# Patient Record
Sex: FEMALE
[De-identification: names, ages, dates, MRNs, and addresses within clinical notes are randomized per-mention and may not be internally consistent; named-entity substitution may affect disease eponyms.]

---

## 2022-07-06 PROBLEM — Z00.00 ENCOUNTER FOR PREVENTIVE HEALTH EXAMINATION: Status: ACTIVE | Noted: 2022-07-06

## 2022-07-08 ENCOUNTER — APPOINTMENT (OUTPATIENT)
Dept: OTOLARYNGOLOGY | Facility: CLINIC | Age: 62
End: 2022-07-08

## 2022-07-08 VITALS
HEIGHT: 58 IN | WEIGHT: 100 LBS | DIASTOLIC BLOOD PRESSURE: 78 MMHG | HEART RATE: 74 BPM | BODY MASS INDEX: 20.99 KG/M2 | RESPIRATION RATE: 18 BRPM | OXYGEN SATURATION: 100 % | TEMPERATURE: 98.4 F | SYSTOLIC BLOOD PRESSURE: 119 MMHG

## 2022-07-08 DIAGNOSIS — Z78.9 OTHER SPECIFIED HEALTH STATUS: ICD-10-CM

## 2022-07-08 DIAGNOSIS — Z80.9 FAMILY HISTORY OF MALIGNANT NEOPLASM, UNSPECIFIED: ICD-10-CM

## 2022-07-08 DIAGNOSIS — Z82.49 FAMILY HISTORY OF ISCHEMIC HEART DISEASE AND OTHER DISEASES OF THE CIRCULATORY SYSTEM: ICD-10-CM

## 2022-07-08 DIAGNOSIS — Z86.39 PERSONAL HISTORY OF OTHER ENDOCRINE, NUTRITIONAL AND METABOLIC DISEASE: ICD-10-CM

## 2022-07-08 PROCEDURE — 69210 REMOVE IMPACTED EAR WAX UNI: CPT

## 2022-07-08 PROCEDURE — 31575 DIAGNOSTIC LARYNGOSCOPY: CPT

## 2022-07-08 PROCEDURE — 99204 OFFICE O/P NEW MOD 45 MIN: CPT | Mod: 25

## 2022-07-08 RX ORDER — OMEPRAZOLE 40 MG/1
40 CAPSULE, DELAYED RELEASE ORAL
Qty: 90 | Refills: 1 | Status: ACTIVE | COMMUNITY
Start: 2022-07-08 | End: 1900-01-01

## 2022-07-08 RX ORDER — IPRATROPIUM BROMIDE 21 UG/1
0.03 SPRAY NASAL
Qty: 3 | Refills: 1 | Status: ACTIVE | COMMUNITY
Start: 2022-07-08 | End: 1900-01-01

## 2022-07-08 NOTE — HISTORY OF PRESENT ILLNESS
[de-identified] : 63 yo F presenting with a cough since March. At the time she went to urgent care and saw her pmd and had cxr and told clear. She saw him again and was told she has postnasal mucus and allergies and was started on allegra and flonase. She saw an ent and was told she had postnasal drip and was referred to an allergist >> She was then told she is allergic to some grasses and dust. She was prescribed Flonase and Allegra till the end of the summer and notices minimal improvement in allergies. She is also using cumin oil for allergies. She was told to see pulm but she has no appt yet. Pt denies heartburn sx. She was referred by sister in law. She uses Qtips. Nonsmoker. No FH or SH pertinent to cc. Denies fevers. chills, sweats.

## 2022-07-08 NOTE — PHYSICAL EXAM
[Midline] : trachea located in midline position [Normal] : no rashes [Laryngoscopy Performed] : laryngoscopy was performed, see procedure section for findings [de-identified] : r copious cerumen removed atraumatically with suction, l nl, b TMs intact [de-identified] : atrophic rhinitis [de-identified] : atrophic rhinitis  [de-identified] : gait steady

## 2022-08-04 ENCOUNTER — NON-APPOINTMENT (OUTPATIENT)
Age: 62
End: 2022-08-04

## 2022-08-12 ENCOUNTER — APPOINTMENT (OUTPATIENT)
Dept: OTOLARYNGOLOGY | Facility: CLINIC | Age: 62
End: 2022-08-12

## 2022-08-12 VITALS
OXYGEN SATURATION: 99 % | BODY MASS INDEX: 20.15 KG/M2 | WEIGHT: 96 LBS | SYSTOLIC BLOOD PRESSURE: 118 MMHG | HEART RATE: 79 BPM | RESPIRATION RATE: 18 BRPM | DIASTOLIC BLOOD PRESSURE: 73 MMHG | TEMPERATURE: 98.2 F | HEIGHT: 58 IN

## 2022-08-12 DIAGNOSIS — J31.0 CHRONIC RHINITIS: ICD-10-CM

## 2022-08-12 PROCEDURE — 99214 OFFICE O/P EST MOD 30 MIN: CPT | Mod: 25

## 2022-08-12 PROCEDURE — 31231 NASAL ENDOSCOPY DX: CPT

## 2022-08-13 PROBLEM — J31.0 CHRONIC RHINITIS: Status: ACTIVE | Noted: 2022-08-13

## 2022-08-13 NOTE — PROCEDURE
[Serial Number: ___] : Serial Number: [unfilled] [Posterior Lesion] : posterior lesion [Anterior rhinoscopy insufficient to account for symptoms] : anterior rhinoscopy insufficient to account for symptoms [Flexible Endoscope] : examined with the flexible endoscope [Pale] : pale [Normal] : the paranasal sinuses had no abnormalities [FreeTextEntry6] : done to followup on atrophic rhinitis and reflux laryngitis as she is still coughing.\par postnasal drip is much better\par laryngeal appearance improved. (scope advanced to see larynx also)

## 2022-08-13 NOTE — HISTORY OF PRESENT ILLNESS
[de-identified] : 1 mo followup exam for this 63 yo F with cough, reflux laryngitis and atrophic rhinitis. She said she is still coughing and that she saw pulmonologist who prescribed benzonatate and has scheduled her for pft's next week. She said on diet, mech and omep for reflux in addition to atrovent the cough is improved, but has not resolved. -f/s/c

## 2022-08-13 NOTE — PHYSICAL EXAM
[Midline] : trachea located in midline position [Normal] : no rashes [de-identified] : thin [de-identified] : thin

## 2022-08-13 NOTE — REASON FOR VISIT
[Subsequent Evaluation] : a subsequent evaluation for [FreeTextEntry2] : cough, reflux laryngitis, atrophic rhinitis

## 2022-09-06 ENCOUNTER — APPOINTMENT (OUTPATIENT)
Dept: OTOLARYNGOLOGY | Facility: CLINIC | Age: 62
End: 2022-09-06

## 2022-09-06 VITALS
BODY MASS INDEX: 20.49 KG/M2 | OXYGEN SATURATION: 99 % | TEMPERATURE: 98.2 F | WEIGHT: 97.6 LBS | HEART RATE: 72 BPM | HEIGHT: 58 IN | RESPIRATION RATE: 15 BRPM | DIASTOLIC BLOOD PRESSURE: 68 MMHG | SYSTOLIC BLOOD PRESSURE: 110 MMHG

## 2022-09-06 DIAGNOSIS — R05.9 COUGH, UNSPECIFIED: ICD-10-CM

## 2022-09-06 PROCEDURE — 99214 OFFICE O/P EST MOD 30 MIN: CPT

## 2022-09-06 NOTE — PROCEDURE
[None] : none [Flexible Endoscope] : examined with the flexible endoscope [Normal] : the false vocal folds were pink and regular, the ventricular sulcus was open, the true vocal folds were glistening white, tense and of equal length, mobility, and height [Hoarseness] : hoarseness not clearly evaluated by indirect laryngoscopy [Dysphagia] : dysphagia not clearly evaluated by indirect laryngoscopy [Serial Number: ___] : Serial Number: [unfilled] [Interarytenoid Edema] : interarytenoid area edematous [de-identified] : done at pt's request to re-evaluate reflux laryngitis, found to have improved iah

## 2022-09-06 NOTE — HISTORY OF PRESENT ILLNESS
[de-identified] : 3.5 week followup for this 61 y/o F with cough, reflux laryngitis, and atrophic rhinitis. She saw pulmonologist for cough and had pulmonary tests and was told they were nl. She feels cough has improved since last visit. For reflux laryngitis she is following diet/ mechanical precautions and she is on omeprazole. She feels it has improved. She also saw GI who started famotidine 20 mg in the evenings and she has upcoming endoscopy in October. She uses atrovent spray for atrophic rhinitis and feels this has improved.

## 2022-09-06 NOTE — ASSESSMENT
[FreeTextEntry1] : 1. Reflux Laryngitis - improving\par -continue diet/ mechanical precautions\par -continue pepcid and prilosec for now\par -pt has upcoming endoscopy in October\par 2. Atrophic Rhinitis\par -continue atrovent\par 3. cough\par -improved\par -continue followup with pulmonary\par RTC in 2 months or sooner as needed\par \par MARIO Hdz was scribe for this note; it was reviewed and modified as necessary by Jonathan Almonte MD\par

## 2022-09-06 NOTE — PHYSICAL EXAM
[Midline] : trachea located in midline position [Laryngoscopy Performed] : laryngoscopy was performed, see procedure section for findings [Normal] : no nystagmus [de-identified] : thin [de-identified] : thin [de-identified] : gait steady

## 2022-10-04 ENCOUNTER — NON-APPOINTMENT (OUTPATIENT)
Age: 62
End: 2022-10-04

## 2022-11-07 ENCOUNTER — APPOINTMENT (OUTPATIENT)
Dept: OTOLARYNGOLOGY | Facility: CLINIC | Age: 62
End: 2022-11-07

## 2022-11-07 VITALS
BODY MASS INDEX: 19.94 KG/M2 | SYSTOLIC BLOOD PRESSURE: 103 MMHG | OXYGEN SATURATION: 98 % | TEMPERATURE: 97.5 F | RESPIRATION RATE: 16 BRPM | HEIGHT: 58 IN | WEIGHT: 95 LBS | DIASTOLIC BLOOD PRESSURE: 65 MMHG | HEART RATE: 68 BPM

## 2022-11-07 DIAGNOSIS — R13.10 DYSPHAGIA, UNSPECIFIED: ICD-10-CM

## 2022-11-07 PROCEDURE — 31575 DIAGNOSTIC LARYNGOSCOPY: CPT

## 2022-11-07 PROCEDURE — 99213 OFFICE O/P EST LOW 20 MIN: CPT | Mod: 25

## 2022-11-07 NOTE — REASON FOR VISIT
[Subsequent Evaluation] : a subsequent evaluation for [FreeTextEntry2] : reflux laryngitis, atrophic rhinitis

## 2022-11-07 NOTE — ASSESSMENT
[FreeTextEntry1] : improved reflux laryngitis but not resolved\par continue diet mech precautions and omeprazole qam famotidine q pm\par rtc 2 mo

## 2022-11-07 NOTE — HISTORY OF PRESENT ILLNESS
[de-identified] : 2 month followup visit for this 61 y/o F with reflux laryngitis. She is following dietary/ mechanical precautions and is on omeprazole and famotidine. She has atrophic rhinitis and is using atrovent nasal spray. She reports significant improvement but not resolution; still has mild dysphagia and odynophagia. She requests to have her larynx scoped again.

## 2022-11-07 NOTE — PROCEDURE
[Complicated Symptoms] : complicated symptoms requiring more thorough examination than provided by mirror [Flexible Endoscope] : examined with the flexible endoscope [Serial Number: ___] : Serial Number: [unfilled] [Normal] : posterior cricoid area had healthy pink mucosa in the interarytenoid area and the esophageal inlet [Interarytenoid Edema] : interarytenoid area edematous [de-identified] : mild iah cw rl, improved

## 2022-11-07 NOTE — PHYSICAL EXAM
[Laryngoscopy Performed] : laryngoscopy was performed, see procedure section for findings [Normal] : no nystagmus [de-identified] : gait steady

## 2023-05-08 ENCOUNTER — APPOINTMENT (OUTPATIENT)
Dept: OTOLARYNGOLOGY | Facility: CLINIC | Age: 63
End: 2023-05-08
Payer: COMMERCIAL

## 2023-05-08 VITALS
WEIGHT: 95 LBS | DIASTOLIC BLOOD PRESSURE: 64 MMHG | HEIGHT: 58 IN | TEMPERATURE: 97.5 F | OXYGEN SATURATION: 98 % | SYSTOLIC BLOOD PRESSURE: 99 MMHG | RESPIRATION RATE: 16 BRPM | HEART RATE: 77 BPM | BODY MASS INDEX: 19.94 KG/M2

## 2023-05-08 DIAGNOSIS — K21.9 ACUTE LARYNGITIS: ICD-10-CM

## 2023-05-08 DIAGNOSIS — J04.0 ACUTE LARYNGITIS: ICD-10-CM

## 2023-05-08 PROCEDURE — 69210 REMOVE IMPACTED EAR WAX UNI: CPT

## 2023-05-08 PROCEDURE — 99213 OFFICE O/P EST LOW 20 MIN: CPT | Mod: 25

## 2023-05-08 PROCEDURE — 31575 DIAGNOSTIC LARYNGOSCOPY: CPT

## 2023-05-08 NOTE — HISTORY OF PRESENT ILLNESS
[de-identified] : 6 month followup visit for this 62 y/o F with h/o reflux laryngitis. She was on famotidine for 3 months and feels reflux is significantly improved. She is following dietary/ mechanical precautions and takes OTC licorice root and feels it helps. SHe wishes to have laryngoscopy to check her vocal cords. Dysphagia and odynophagia are improved.

## 2023-05-08 NOTE — PHYSICAL EXAM
[Midline] : trachea located in midline position [Laryngoscopy Performed] : laryngoscopy was performed, see procedure section for findings [de-identified] : b copious cerumen removed atraumatically with suction l>r, b TMs nl  [Normal] : no nystagmus [de-identified] : gait steady

## 2023-05-08 NOTE — ASSESSMENT
[FreeTextEntry1] : 1. b cerumen impaction \par -cerumen removed\par -ears felt better\par -avoid qtip usage\par 2. reflux laryngitis improved but still has some dysphagia (very mild)\par -continue diet/ mechanical precautions\par RTC in 6 months; asked to call sooner for any issues

## 2023-05-08 NOTE — PROCEDURE
[Cerumen Impaction] : Cerumen Impaction [Same] : same as the Pre Op Dx. [] : Removal of Cerumen [Complicated Symptoms] : complicated symptoms requiring more thorough examination than provided by mirror [None] : none [Flexible Endoscope] : examined with the flexible endoscope [Normal] : the false vocal folds were pink and regular, the ventricular sulcus was open, the true vocal folds were glistening white, tense and of equal length, mobility, and height [Serial Number: ___] : Serial Number: [unfilled] [Interarytenoid Edema] : interarytenoid area edematous [de-identified] : done for h/o reflux laryngitis\par found to have improvement in rl, mild iah  [FreeTextEntry5] : b copious cerumen removed atraumatically with suction l>r, b TMs nl

## 2023-11-08 ENCOUNTER — APPOINTMENT (OUTPATIENT)
Dept: OTOLARYNGOLOGY | Facility: CLINIC | Age: 63
End: 2023-11-08
Payer: MEDICARE

## 2023-11-08 VITALS
DIASTOLIC BLOOD PRESSURE: 76 MMHG | SYSTOLIC BLOOD PRESSURE: 135 MMHG | HEIGHT: 58 IN | WEIGHT: 95 LBS | OXYGEN SATURATION: 88 % | HEART RATE: 90 BPM | BODY MASS INDEX: 19.94 KG/M2

## 2023-11-08 DIAGNOSIS — J04.0 ACUTE LARYNGITIS: ICD-10-CM

## 2023-11-08 DIAGNOSIS — R13.14 DYSPHAGIA, PHARYNGOESOPHAGEAL PHASE: ICD-10-CM

## 2023-11-08 DIAGNOSIS — K21.9 ACUTE LARYNGITIS: ICD-10-CM

## 2023-11-08 PROCEDURE — 31575 DIAGNOSTIC LARYNGOSCOPY: CPT

## 2023-11-08 PROCEDURE — 99213 OFFICE O/P EST LOW 20 MIN: CPT | Mod: 25

## 2023-11-08 PROCEDURE — 69210 REMOVE IMPACTED EAR WAX UNI: CPT

## 2024-05-06 ENCOUNTER — APPOINTMENT (OUTPATIENT)
Dept: OTOLARYNGOLOGY | Facility: CLINIC | Age: 64
End: 2024-05-06
Payer: MEDICARE

## 2024-05-06 VITALS
WEIGHT: 93 LBS | DIASTOLIC BLOOD PRESSURE: 64 MMHG | OXYGEN SATURATION: 98 % | HEART RATE: 83 BPM | HEIGHT: 59 IN | SYSTOLIC BLOOD PRESSURE: 100 MMHG | TEMPERATURE: 98 F | BODY MASS INDEX: 18.75 KG/M2

## 2024-05-06 DIAGNOSIS — J04.0 ACUTE LARYNGITIS: ICD-10-CM

## 2024-05-06 DIAGNOSIS — H61.23 IMPACTED CERUMEN, BILATERAL: ICD-10-CM

## 2024-05-06 DIAGNOSIS — H61.21 IMPACTED CERUMEN, RIGHT EAR: ICD-10-CM

## 2024-05-06 DIAGNOSIS — J31.0 CHRONIC RHINITIS: ICD-10-CM

## 2024-05-06 DIAGNOSIS — K21.9 ACUTE LARYNGITIS: ICD-10-CM

## 2024-05-06 PROCEDURE — 99213 OFFICE O/P EST LOW 20 MIN: CPT | Mod: 25

## 2024-05-06 PROCEDURE — 69210 REMOVE IMPACTED EAR WAX UNI: CPT

## 2024-05-06 PROCEDURE — 31575 DIAGNOSTIC LARYNGOSCOPY: CPT

## 2024-05-06 RX ORDER — IPRATROPIUM BROMIDE 21 UG/1
0.03 SPRAY NASAL
Qty: 3 | Refills: 1 | Status: ACTIVE | COMMUNITY
Start: 2024-05-06 | End: 1900-01-01

## 2024-05-06 NOTE — PROCEDURE
[Cerumen Impaction] : Cerumen Impaction [Same] : same as the Pre Op Dx. [] : Removal of Cerumen [Complicated Symptoms] : complicated symptoms requiring more thorough examination than provided by mirror [Topical Lidocaine] : topical lidocaine [Oxymetazoline HCl] : oxymetazoline HCl [Flexible Endoscope] : examined with the flexible endoscope [Normal] : posterior cricoid area had healthy pink mucosa in the interarytenoid area and the esophageal inlet [Serial Number: ___] : Serial Number: [unfilled] [de-identified] : done to evaluate reflux found to have mild iah c/w rl  [FreeTextEntry5] : l nl, r copious cerumen removed atraumatically with suction, b TMs nl

## 2024-05-06 NOTE — HISTORY OF PRESENT ILLNESS
[de-identified] : 6 month follow up visit for this 63 y/o F with h/o recurrent cerumen impaction. She is here to recheck her ears. She uses qtips. For reflux she is following dietary/ mechanical precautions and feels it is well controlled. She is c/o rhinorrhea for the past 3 days. She does not feel ill. Has used atrovent in the past for atrophic rhinitis.

## 2024-05-06 NOTE — REASON FOR VISIT
[Subsequent Evaluation] : a subsequent evaluation for [FreeTextEntry2] : nasal drip, reflux laryngitis

## 2024-05-06 NOTE — ASSESSMENT
[FreeTextEntry1] : 1. r cerumen impaction  -cerumen removed -ear felt better -avoid qtip usage 2. Atrophic Rhinitis -Atrovent, confirmed no h/o glaucoma (she has h/o cataracts) 3. reflux laryngitis -continue diet/ mechanical precautions RTC as needed if she becomes symptomatic

## 2024-05-06 NOTE — PHYSICAL EXAM
[Normal] : mucosa is normal [Midline] : trachea located in midline position [Laryngoscopy Performed] : laryngoscopy was performed, see procedure section for findings [de-identified] : l nl, r copious cerumen removed atraumatically with suction, b TMs nl  [de-identified] : thin [de-identified] : thin [de-identified] : gait steady
